# Patient Record
Sex: MALE | Employment: OTHER | URBAN - METROPOLITAN AREA
[De-identification: names, ages, dates, MRNs, and addresses within clinical notes are randomized per-mention and may not be internally consistent; named-entity substitution may affect disease eponyms.]

---

## 2022-04-01 ENCOUNTER — HOSPITAL ENCOUNTER (EMERGENCY)
Facility: CLINIC | Age: 42
Discharge: HOME | End: 2022-04-01
Attending: EMERGENCY MEDICINE | Admitting: EMERGENCY MEDICINE

## 2022-04-01 VITALS
HEART RATE: 63 BPM | OXYGEN SATURATION: 100 % | DIASTOLIC BLOOD PRESSURE: 73 MMHG | SYSTOLIC BLOOD PRESSURE: 115 MMHG | TEMPERATURE: 98.1 F | RESPIRATION RATE: 20 BRPM

## 2022-04-01 DIAGNOSIS — S61.412A LACERATION OF LEFT HAND WITHOUT FOREIGN BODY, INITIAL ENCOUNTER: ICD-10-CM

## 2022-04-01 PROCEDURE — 250N000011 HC RX IP 250 OP 636: Performed by: EMERGENCY MEDICINE

## 2022-04-01 PROCEDURE — 90471 IMMUNIZATION ADMIN: CPT | Performed by: EMERGENCY MEDICINE

## 2022-04-01 PROCEDURE — 90715 TDAP VACCINE 7 YRS/> IM: CPT | Performed by: EMERGENCY MEDICINE

## 2022-04-01 PROCEDURE — 99283 EMERGENCY DEPT VISIT LOW MDM: CPT

## 2022-04-01 PROCEDURE — 13131 CMPLX RPR F/C/C/M/N/AX/G/H/F: CPT

## 2022-04-01 RX ORDER — LIDOCAINE HYDROCHLORIDE AND EPINEPHRINE 10; 10 MG/ML; UG/ML
5 INJECTION, SOLUTION INFILTRATION; PERINEURAL ONCE
Status: DISCONTINUED | OUTPATIENT
Start: 2022-04-01 | End: 2022-04-01 | Stop reason: HOSPADM

## 2022-04-01 RX ADMIN — CLOSTRIDIUM TETANI TOXOID ANTIGEN (FORMALDEHYDE INACTIVATED), CORYNEBACTERIUM DIPHTHERIAE TOXOID ANTIGEN (FORMALDEHYDE INACTIVATED), BORDETELLA PERTUSSIS TOXOID ANTIGEN (GLUTARALDEHYDE INACTIVATED), BORDETELLA PERTUSSIS FILAMENTOUS HEMAGGLUTININ ANTIGEN (FORMALDEHYDE INACTIVATED), BORDETELLA PERTUSSIS PERTACTIN ANTIGEN, AND BORDETELLA PERTUSSIS FIMBRIAE 2/3 ANTIGEN 0.5 ML: 5; 2; 2.5; 5; 3; 5 INJECTION, SUSPENSION INTRAMUSCULAR at 13:12

## 2022-04-01 ASSESSMENT — ENCOUNTER SYMPTOMS: WOUND: 1

## 2022-04-01 NOTE — ED PROVIDER NOTES
History   Chief Complaint:  Laceration     The history is provided by the patient.      Quincy Orourke is a 41 year old male who presents with laceration. Patient was cutting silicone with a knife and cut himself on the back side of his left hand near his thumb. He notes intermittent numbness though everything feels normal to the touch. Last tetanus in 2007 per [facility].      Review of Systems   Skin: Positive for wound.   Neurological: Negative for weakness.   All other systems reviewed and are negative.    Allergies:  The patient has no known allergies.     Medications:  The patient denies the use of medications.     Past Medical History:     The patient denies past medical history.     Social History:  Presents to ED alone     Physical Exam     Patient Vitals for the past 24 hrs:   BP Temp Pulse Resp SpO2   04/01/22 0947 118/69 98.1  F (36.7  C) 65 20 98 %       Physical Exam  Head:  The scalp, face, and head appear normal  Eyes:  Conjunctivae are normal  ENT:    The nose is normal    Pinnae are normal  Neck:  Trachea midline  CV:  Normal rate, regular rhythm. Normal cap refill distal to laceration  Resp:  No respiratory distress   Musc:  Normal muscular tone    Left thumb - 2cm laceration overlying dorsum of web space with penetration into muscle layer. No tendon involvement. No nerve involvement. Full strength with adduction, abduction, flexion, extension, and opposition.   Skin:  No rash or lesions noted  Neuro: Speech is normal and fluent. Face is symmetric. Moving all extremities well.   Psych:  Awake. Alert.  Normal affect.  Appropriate interactions.    Emergency Department Course     Procedures      Laceration Repair        LACERATION:  A complex, clean 2 cm laceration.      LOCATION:  Dorsum of left hand in web space between thumb and index finger       FUNCTION:  Distally sensation, circulation, motor and tendon function are intact.       ANESTHESIA:  Local using lidocaine 1% with epi total of 3  mLs      PREPARATION:  Irrigation with Normal Saline      DEBRIDEMENT:  no debridement and wound explored, no foreign body found      CLOSURE:  Wound was closed with Two Layers: Fascial layer closed with 1 x 4.0 Vicryl Rapide Sutures. Skin closed with 5 x 4.0 Vicryl rapide Sutures using interrupted sutures    Emergency Department Course:     Reviewed:  I reviewed nursing notes and vitals     Assessments:  1211 I obtained history and examined the patient as noted above.   1254 I rechecked the patient and explained findings. Repaired laceration as noted above.     Interventions:  1312 Tdap 0.5 mL Intramuscular     Disposition:  The patient was discharged to home.     Impression & Plan       Medical Decision Making:  Pt presents with hand laceration, while laceration penetrated into muscle layer, there is no evidence of weakness/loss of function. Pt advised to not stress hand while it heals. 2-layer repair as above. He is in stable condition at the time of discharge, indications for return to the ED were discussed as well as follow up. All questions were answered and he is in agreement with the plan.       Diagnosis:    ICD-10-CM    1. Laceration of left hand without foreign body, initial encounter  S61.412A        Scribe Disclosure:  IRAIS, Shawn Rios, am serving as a scribe at 11:19 AM on 4/1/2022 to document services personally performed by Ignacio Mendoza MD based on my observations and the provider's statements to me.            Ignacio Mendoza MD  04/02/22 [id number]

## 2022-04-02 ASSESSMENT — ENCOUNTER SYMPTOMS: WEAKNESS: 0
